# Patient Record
Sex: FEMALE | Race: WHITE | URBAN - METROPOLITAN AREA
[De-identification: names, ages, dates, MRNs, and addresses within clinical notes are randomized per-mention and may not be internally consistent; named-entity substitution may affect disease eponyms.]

---

## 2017-12-29 ENCOUNTER — GENERIC CONVERSION - ENCOUNTER (OUTPATIENT)
Dept: OTHER | Facility: OTHER | Age: 27
End: 2017-12-29

## 2018-01-23 NOTE — MISCELLANEOUS
Message   Recorded as Task   Date: 12/29/2017 01:36 PM, Created By: Doug Ferris   Task Name: Medical Complaint Callback   Assigned To: Doug Ferris   Regarding Patient: Pietro Gee, Status: Active   CommentLynell Blitz - 29 Dec 2017 1:36 PM     TASK CREATED  Mother call s with concerns that Elita Bernheim has had sick contact with her older brother Carlo Wilkins - She has a productive cough for the past 6 days  Runny nose, congestion, headache  Denies fever  Instructed to take to urgent care as no provider available today  Thank You    Tatiana Rust RN        Signatures   Electronically signed by : Afton Harada, RN; Dec 29 2017  1:36PM EST                       (Author)